# Patient Record
Sex: FEMALE | Race: WHITE
[De-identification: names, ages, dates, MRNs, and addresses within clinical notes are randomized per-mention and may not be internally consistent; named-entity substitution may affect disease eponyms.]

---

## 2018-01-10 ENCOUNTER — HOSPITAL ENCOUNTER (EMERGENCY)
Dept: HOSPITAL 25 - UCCORT | Age: 43
Discharge: HOME | End: 2018-01-10
Payer: COMMERCIAL

## 2018-01-10 VITALS — SYSTOLIC BLOOD PRESSURE: 121 MMHG | DIASTOLIC BLOOD PRESSURE: 86 MMHG

## 2018-01-10 DIAGNOSIS — F17.210: ICD-10-CM

## 2018-01-10 DIAGNOSIS — Z88.0: ICD-10-CM

## 2018-01-10 DIAGNOSIS — Z88.1: ICD-10-CM

## 2018-01-10 DIAGNOSIS — J32.9: Primary | ICD-10-CM

## 2018-01-10 PROCEDURE — G0463 HOSPITAL OUTPT CLINIC VISIT: HCPCS

## 2018-01-10 PROCEDURE — 99212 OFFICE O/P EST SF 10 MIN: CPT

## 2022-12-07 NOTE — UC
increased bilateral lower leg swelling. Normally takes lasix and the swelling resolves in a couple days. This episode of swelling has been ongoing for the past 2 weeks. Denies shortness of breath/chest pain. HTN in triage, takes BP meds, took morning medications today.     Throat Pain/Nasal Giuliano HPI





- HPI Summary


HPI Summary: 





2 weeks of sinus congestion which has not lead to pain and discomfort. She is a 

smoker. She has tried decongestants otc without any help. No fevers. Minimal 

cough. 





- History of Current Complaint


Chief Complaint: UCRespiratory


Stated Complaint: SINUS COMPLAINT


Time Seen by Provider: 01/10/18 07:32


Hx Obtained From: Patient


Hx Last Menstrual Period: yesterday


Onset/Duration: Sudden Onset, Lasting Weeks


Severity: Moderate


Cough: Nonproductive


Associated Signs & Symptoms: Positive: Sinus Discomfort.  Negative: Dysphagia, 

FB Sensation, Drooling, Fever, Vomiting, Rash


Related History: Smoking





- Allergies/Home Medications


Allergies/Adverse Reactions: 


 Allergies











Allergy/AdvReac Type Severity Reaction Status Date / Time


 


Cefaclor [From Ceclor] Allergy Severe Hives Verified 01/10/18 07:42


 


Ciprofloxacin [From Cipro] Allergy Severe Hives Verified 01/10/18 07:42


 


Penicillins Allergy  Hives Verified 01/10/18 07:42














PMH/Surg Hx/FS Hx/Imm Hx


Previously Healthy: No - smoker. 





- Surgical History


Surgical History: Yes


Surgery Procedure, Year, and Place: Tonsillectomy, 1982, [facility].  Tubal 

ligation





- Family History


Known Family History: 


   Negative: Hypertension, Diabetes





- Social History


Occupation: Employed Full-time


Lives: With Family


Alcohol Use: None


Substance Use Type: None


Smoking Status (MU): Current Every Day Smoker


Type: Cigarettes





Review of Systems


ENT: Sinus Congestion, Sinus Pain/Tenderness


All Other Systems Reviewed And Are Negative: Yes





Physical Exam


Triage Information Reviewed: Yes


Appearance: Well-Appearing, No Pain Distress, Well-Nourished


Vital Signs: 


 Initial Vital Signs











Temp  98.5 F   01/10/18 07:39


 


Pulse  89   01/10/18 07:39


 


Resp  18   01/10/18 07:39


 


BP  121/86   01/10/18 07:39











Vital Signs Reviewed: Yes


Eyes: Positive: Conjunctiva Clear


ENT: Positive: Pharynx normal, TMs normal, Sinus tenderness.  Negative: 

Pharyngeal erythema, Nasal congestion, Nasal drainage, TM bulging, TM dull, TM 

red, Tonsillar swelling, Tonsillar exudate, Trismus, Muffled voice


Neck: Positive: Supple, Nontender, No Lymphadenopathy


Respiratory: Positive: Lungs clear, Normal breath sounds, No accessory muscle 

use.  Negative: Respiratory distress, Decreased breath sounds, Accessory muscle 

use, Crackles, Rhonchi, Stridor, Wheezing


Cardiovascular: Positive: No Murmur, Pulses Normal, Brisk Capillary Refill


Abdomen Description: Positive: No Organomegaly, Soft.  Negative: Distended, 

Guarding


Musculoskeletal: Positive: Strength Intact, ROM Intact, No Edema


Neurological: Positive: Alert, Muscle Tone Normal.  Negative: Fatigued


Psychological: Positive: Normal Response To Family, Age Appropriate Behavior


Skin: Negative: rashes





Throat Pain/Nasal Course/Dx





- Course


Course Of Treatment: supportive care and antibiotics.





- Differential Dx/Diagnosis


Provider Diagnoses: sinusitis.





Discharge





- Discharge Plan


Condition: Good


Disposition: HOME


Prescriptions: 


Sulfamethox/Trimethoprim DS* [Bactrim /160 TAB*] 1 tab PO BID #20 tab


Patient Education Materials:  Sinusitis (ED)


Referrals: 


Carroll Francois MD [Primary Care Provider] - 


Additional Instructions: 


Continue decongestants and add nasal irrigation such as netti pots or saline 

irrigation rinses.